# Patient Record
Sex: MALE | Race: BLACK OR AFRICAN AMERICAN | Employment: FULL TIME | URBAN - METROPOLITAN AREA
[De-identification: names, ages, dates, MRNs, and addresses within clinical notes are randomized per-mention and may not be internally consistent; named-entity substitution may affect disease eponyms.]

---

## 2017-05-30 ENCOUNTER — TELEPHONE (OUTPATIENT)
Dept: SURGERY | Age: 42
End: 2017-05-30

## 2017-12-11 ENCOUNTER — OFFICE VISIT (OUTPATIENT)
Dept: INTERNAL MEDICINE CLINIC | Age: 42
End: 2017-12-11

## 2017-12-11 VITALS
HEART RATE: 119 BPM | WEIGHT: 282 LBS | HEIGHT: 67 IN | BODY MASS INDEX: 44.26 KG/M2 | RESPIRATION RATE: 18 BRPM | DIASTOLIC BLOOD PRESSURE: 84 MMHG | TEMPERATURE: 98.5 F | OXYGEN SATURATION: 93 % | SYSTOLIC BLOOD PRESSURE: 112 MMHG

## 2017-12-11 DIAGNOSIS — I10 ESSENTIAL HYPERTENSION: ICD-10-CM

## 2017-12-11 DIAGNOSIS — M10.00 IDIOPATHIC GOUT, UNSPECIFIED CHRONICITY, UNSPECIFIED SITE: Primary | ICD-10-CM

## 2017-12-11 RX ORDER — PREDNISONE 20 MG/1
40 TABLET ORAL
Qty: 20 TAB | Refills: 0 | Status: SHIPPED | OUTPATIENT
Start: 2017-12-11

## 2017-12-11 RX ORDER — COLCHICINE 0.6 MG/1
TABLET ORAL
Qty: 30 TAB | Refills: 5 | Status: SHIPPED | OUTPATIENT
Start: 2017-12-11

## 2017-12-11 NOTE — PROGRESS NOTES
HISTORY OF PRESENT ILLNESS  Aysha Beth is a 43 y.o. male. HPI  Here for gout that started Friday. He missed work Sunday-today. He last had a spell a year ago. He is out of colchicine. He has good HTN control since we adjusted his meds   Past Medical History:   Diagnosis Date    Allergic rhinitis     DDD (degenerative disc disease), lumbar     Depression     Gout 8/30/2011    Hypercholesterolemia     PT UNAWARE OF THIS DIAGNOSIS ON 7/12/12    Hypertension     Morbid obesity ([address] 8/30/2011    DELILAH on CPAP     Right knee DJD      Current Outpatient Prescriptions on File Prior to Visit   Medication Sig Dispense Refill    amLODIPine-benazepril (LOTREL) 5-20 mg per capsule Take 1 Cap by mouth daily. 90 Cap 1    cyanocobalamin (VITAMIN B-12) 500 mcg tablet Take 500 mcg by mouth daily.  calcium 500 mg Tab Take 500 mg by mouth three (3) times daily.  omeprazole (PRILOSEC) 40 mg capsule Take 1 Cap by mouth daily. 30 Cap 2    MULTIVITAMIN PO Take  by mouth daily.  cetirizine (ZYRTEC) 10 mg tablet Take  by mouth daily. No current facility-administered medications on file prior to visit. Review of Systems   All other systems reviewed and are negative. Visit Vitals    /84 (BP 1 Location: Left arm, BP Patient Position: At rest)    Pulse (!) 119    Temp 98.5 °F (36.9 °C) (Oral)    Resp 18    Ht 5' 7\" (1.702 m)    Wt 282 lb (127.9 kg)    SpO2 93%    BMI 44.17 kg/m2       Physical Exam   Constitutional: He appears well-developed and well-nourished. Musculoskeletal: He exhibits tenderness. Right great toe mildly tender.,   Nursing note and vitals reviewed. ASSESSMENT and PLAN  Diagnoses and all orders for this visit:      1. Idiopathic gout, unspecified chronicity, unspecified site  -     colchicine 0.6 mg tablet; Two now then one in two hours then one daily. -     predniSONE (DELTASONE) 20 mg tablet; Take 2 Tabs by mouth daily (with breakfast).     2. Essential hypertension  The current medical regimen is effective;  continue present plan and medications.           Reviewed plan of care with the patient who has provided input and agrees with the goals

## 2017-12-11 NOTE — LETTER
NOTIFICATION RETURN TO WORK / SCHOOL 
 
12/11/2017 11:24 AM 
 
Mr. Shameka Martin [place] [address] [address] To Whom It May Concern: 
 
Shameka Martin is currently under the care of Rhaeem Chambers.. He will return to work on Wednesday December 13,2017 with no rescrictions. If there are questions or concerns please have the patient contact our office.  
 
 
 
Sincerely, 
 
 
Candice Taylor MD

## 2017-12-11 NOTE — MR AVS SNAPSHOT
[address] [place] [address] [address] [facility] 
[phone] Patient: Hernán Conteh MRN: [id number] MCE:[birth year] Visit Information Date & Time Provider Department Dept. Phone Encounter #  
 12/11/2017 10:45 AM Boris Griffiths MD [facility] [phone] [id number] Upcoming Health Maintenance Date Due DTaP/Tdap/Td series (1 - Tdap) 6/2/1996 Allergies as of 12/11/2017  Review Complete On: 12/11/2017 By: Radha Gordon No Known Allergies Current Immunizations  Never Reviewed No immunizations on file. Not reviewed this visit You Were Diagnosed With   
  
 Codes Comments Essential hypertension    -  Primary ICD-10-CM: I10 
ICD-9-CM: 401.9 Idiopathic gout, unspecified chronicity, unspecified site     ICD-10-CM: M10.00 ICD-9-CM: 274.9 Vitals BP Pulse Temp Resp Height(growth percentile) Weight(growth percentile) 112/84 (BP 1 Location: Left arm, BP Patient Position: At rest) (!) 119 98.5 °F (36.9 °C) (Oral) 18 5' 7\" (1.702 m) 282 lb (127.9 kg) SpO2 BMI Smoking Status 93% 44.17 kg/m2 Never Smoker BMI and BSA Data Body Mass Index Body Surface Area  
 44.17 kg/m 2 2.46 m 2 Preferred Pharmacy Pharmacy Name Phone [facility] [address] [place] [address] [phone] Your Updated Medication List  
  
   
This list is accurate as of: 12/11/17 11:23 AM.  Always use your most recent med list. amLODIPine-benazepril 5-20 mg per capsule Commonly known as:  Ariadne Ends Take 1 Cap by mouth daily. calcium 500 mg Tab Take 500 mg by mouth three (3) times daily. colchicine 0.6 mg tablet Two now then one in two hours then one daily. MULTIVITAMIN PO Take  by mouth daily. omeprazole 40 mg capsule Commonly known as:  PRILOSEC Take 1 Cap by mouth daily. predniSONE 20 mg tablet Commonly known as:  Orlean Aas Take 2 Tabs by mouth daily (with breakfast). VITAMIN B-12 500 mcg tablet Generic drug:  cyanocobalamin Take 500 mcg by mouth daily. ZyrTEC 10 mg tablet Generic drug:  cetirizine Take  by mouth daily. Prescriptions Sent to Pharmacy Refills  
 colchicine 0.6 mg tablet 5 Sig: Two now then one in two hours then one daily. Class: Normal  
 Pharmacy: [address] Ph #: [phone]  
 predniSONE (DELTASONE) 20 mg tablet 0 Sig: Take 2 Tabs by mouth daily (with breakfast). Class: Normal  
 Pharmacy: [facility], [address] Vipul Caicedo Ph #: [phone] Route: Oral  
  
Introducing [facility]! Roberto Shrestha introduces [employer] patient portal. Now you can access parts of your medical record, email your doctor's office, and request medication refills online. 1. In your internet browser, go to [email or web address] 2. Click on the First Time User? Click Here link in the Sign In box. You will see the New Member Sign Up page. 3. Enter your Vidible Access Code exactly as it appears below. You will not need to use this code after youve completed the sign-up process. If you do not sign up before the expiration date, you must request a new code. · Vidible Access Code: [email or web address] Expires: 3/11/2018 11:23 AM 
 
4. Enter the last four digits of your Social Security Number (xxxx) and Date of Birth (mm/dd/yyyy) as indicated and click Submit. You will be taken to the next sign-up page. 5. Create a [employer] ID. This will be your Vidible login ID and cannot be changed, so think of one that is secure and easy to remember. 6. Create a [employer] password. You can change your password at any time. 7. Enter your Password Reset Question and Answer. This can be used at a later time if you forget your password. 8. Enter your e-mail address. You will receive e-mail notification when new information is available in [address] 9. Click Sign Up. You can now view and download portions of your medical record. 10. Click the Download Summary menu link to download a portable copy of your medical information. If you have questions, please visit the Frequently Asked Questions section of the [facility] [facility] website. Remember, [facility] is NOT to be used for urgent needs. For medical emergencies, dial 911. Now available from your iPhone and Android! Please provide this summary of care documentation to your next provider. Your primary care clinician is listed as [id number] [address] [place] [address] [address]. If you have any questions after today's visit, please call [phone].

## 2017-12-11 NOTE — PROGRESS NOTES
1. Have you been to the ER, urgent care clinic since your last visit? Hospitalized since your last visit? No    2. Have you seen or consulted any other health care providers outside of the [address] [place] [address] since your last visit? Include any pap smears or colon screening.  No   Chief Complaint   Patient presents with    Gout     Fasting